# Patient Record
Sex: FEMALE | Race: WHITE | NOT HISPANIC OR LATINO | Employment: FULL TIME | URBAN - NONMETROPOLITAN AREA
[De-identification: names, ages, dates, MRNs, and addresses within clinical notes are randomized per-mention and may not be internally consistent; named-entity substitution may affect disease eponyms.]

---

## 2018-03-01 ENCOUNTER — DOCUMENTATION ONLY (OUTPATIENT)
Dept: FAMILY MEDICINE | Facility: OTHER | Age: 53
End: 2018-03-01

## 2018-03-01 RX ORDER — SUCRALFATE 1 G/1
1 TABLET ORAL
COMMUNITY
Start: 2016-08-30 | End: 2019-07-11

## 2019-07-11 ENCOUNTER — OFFICE VISIT (OUTPATIENT)
Dept: FAMILY MEDICINE | Facility: OTHER | Age: 54
End: 2019-07-11
Attending: FAMILY MEDICINE
Payer: COMMERCIAL

## 2019-07-11 VITALS
HEIGHT: 67 IN | RESPIRATION RATE: 20 BRPM | HEART RATE: 72 BPM | TEMPERATURE: 97.5 F | DIASTOLIC BLOOD PRESSURE: 70 MMHG | WEIGHT: 152 LBS | BODY MASS INDEX: 23.86 KG/M2 | SYSTOLIC BLOOD PRESSURE: 102 MMHG | OXYGEN SATURATION: 95 %

## 2019-07-11 DIAGNOSIS — R21 RASH: ICD-10-CM

## 2019-07-11 DIAGNOSIS — Z82.49 FAMILY HISTORY OF ISCHEMIC HEART DISEASE: Primary | ICD-10-CM

## 2019-07-11 PROCEDURE — 99203 OFFICE O/P NEW LOW 30 MIN: CPT | Performed by: FAMILY MEDICINE

## 2019-07-11 RX ORDER — PREDNISONE 20 MG/1
20 TABLET ORAL 2 TIMES DAILY
Qty: 10 TABLET | Refills: 0 | Status: SHIPPED | OUTPATIENT
Start: 2019-07-11 | End: 2019-08-20

## 2019-07-11 RX ORDER — CHOLECALCIFEROL (VITAMIN D3) 50 MCG
1 TABLET ORAL DAILY
COMMUNITY
Start: 2019-07-11

## 2019-07-11 RX ORDER — METAPROTERENOL SULFATE 10 MG
500 TABLET ORAL DAILY
COMMUNITY
Start: 2019-07-11

## 2019-07-11 ASSESSMENT — ENCOUNTER SYMPTOMS
CHEST TIGHTNESS: 0
FEVER: 0
COUGH: 0
CHILLS: 0

## 2019-07-11 ASSESSMENT — PAIN SCALES - GENERAL: PAINLEVEL: NO PAIN (0)

## 2019-07-11 ASSESSMENT — MIFFLIN-ST. JEOR: SCORE: 1322.1

## 2019-07-11 ASSESSMENT — PATIENT HEALTH QUESTIONNAIRE - PHQ9: SUM OF ALL RESPONSES TO PHQ QUESTIONS 1-9: 9

## 2019-07-11 NOTE — PROGRESS NOTES
"  SUBJECTIVE:   Nursing Notes:   Tasneem Green LPN  2019  1:19 PM  Sign at exiting of workspace  Chief Complaint   Patient presents with     Heart Problem     referral for heart scan      Patient is requesting to be seen by the heart institute in [place]. She believes it is called a heart scan . She has family history of heart problems.   Initial /70 (BP Location: Right arm, Patient Position: Sitting, Cuff Size: Adult Regular)   Pulse 72   Temp 97.5  F (36.4  C) (Tympanic)   Resp 20   Ht 1.702 m (5' 7\")   Wt 68.9 kg (152 lb)   SpO2 95%   BMI 23.81 kg/m    Estimated body mass index is 23.81 kg/m  as calculated from the following:    Height as of this encounter: 1.702 m (5' 7\").    Weight as of this encounter: 68.9 kg (152 lb).  Medication Reconciliation: complete    Tasneem Green LPN    Rhoda Herrera is a 53 year old female who presents to clinic today to discuss a positive family history of heart disease.  Would like a referral to [place] to have a heart scan.  Mom and dad have both had history of coronary artery disease.  Dad  of massive MI at age 77.  This was his first MI, but may have had issues prior to this (never wanted to go to the doctor).  Mom has had stents in 2 arteries several years ago.  She has not had any chest pain, but has had episodes of shortness of breath at times.  Has had episodes where she would feel like she could smell smoke, but no smoke was there.  If she blew her nose, it would go away.  Yet, she doesn't feel like she has chronic issues with congestion.  She does feel that her sense of smell is not as sharp as it used to be.  No shortness of breath with exertion typically.  No diaphoretic.  She has had issues with palpitations in the past since around age 21.  She has had a Holter monitor in the past.  She has been taking benadryl at night for the past week due to poison ivy.      Also has a rash on her legs and forearms which she thinks may be poison ivy.  " She has not been spending any significant time in weeds, etc.  However, she was sitting in her husbands truck with shorts on and thinks she may have sat on his work clothes which may have been in contact with poison ivy.      HPI    I personally reviewed medications/allergies/history listed below:    Patient Active Problem List    Diagnosis Date Noted     Epigastric pain 2016     Priority: Medium     Past Medical History:   Diagnosis Date     Gastro-esophageal reflux disease without esophagitis     No Comments Provided     Hiatal hernia       Past Surgical History:   Procedure Laterality Date      SECTION       (2 , 1 , 1 )     TONSILLECTOMY      No Comments Provided     Family History   Problem Relation Age of Onset     Heart Disease Mother      Heart Disease Father      Chronic Obstructive Pulmonary Disease Father         asthma/allergies     GERD Father         had a Nissen for Hiatal Hernia     Fibrocystic breast disease Sister      Diabetes Type 2  Brother      Heart Failure Brother         smoker (may have drank and used some other substances too)     Dementia Maternal Grandmother      Other - See Comments Maternal Grandfather         Bowel Obstruction - uncertain cause     Heart Disease Paternal Grandmother          in her sleep     Family History Negative Paternal Grandfather          of old age in his 90s     Social History     Tobacco Use     Smoking status: Former Smoker     Packs/day: 0.50     Years: 18.00     Pack years: 9.00     Types: Cigarettes     Smokeless tobacco: Never Used   Substance Use Topics     Alcohol use: Yes     Comment: Alcoholic Drinks/day: rare     Social History     Social History Narrative    .  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     - Patricio -  - teaches at [employer] in [place].     Current Outpatient Medications   Medication Sig Dispense Refill     aspirin (ASA) 81 MG EC tablet Take 1  "tablet (81 mg) by mouth daily       magnesium aspartate (MAGINEX) 615 MG EC tablet Take 1 tablet (615 mg) by mouth daily       Omega-3 Fish Oil 500 MG capsule Take 1 capsule (500 mg) by mouth daily       predniSONE (DELTASONE) 20 MG tablet Take 1 tablet (20 mg) by mouth 2 times daily for 5 days 10 tablet 0     vitamin D3 (CHOLECALCIFEROL) 2000 units (50 mcg) tablet Take 1 tablet (2,000 Units) by mouth daily       No Known Allergies    Review of Systems   Constitutional: Negative for chills and fever.   Respiratory: Negative for cough and chest tightness.    Cardiovascular: Negative for peripheral edema.   Psychiatric/Behavioral: Negative for mood changes.        OBJECTIVE:     /70 (BP Location: Right arm, Patient Position: Sitting, Cuff Size: Adult Regular)   Pulse 72   Temp 97.5  F (36.4  C) (Tympanic)   Resp 20   Ht 1.702 m (5' 7\")   Wt 68.9 kg (152 lb)   SpO2 95%   BMI 23.81 kg/m    Body mass index is 23.81 kg/m .  Physical Exam   Constitutional: She appears well-developed.   HENT:   Head: Normocephalic.   Eyes: Pupils are equal, round, and reactive to light.   Neck: Normal range of motion. Neck supple. No thyromegaly present.   Cardiovascular: Normal rate, regular rhythm, normal heart sounds and intact distal pulses.   No murmur heard.  Pulmonary/Chest: Effort normal and breath sounds normal. She has no wheezes. She has no rales.   Musculoskeletal: She exhibits no edema.   Lymphadenopathy:     She has no cervical adenopathy.   Skin: Skin is warm and dry.   On arms and legs there are multiple excoriated and/or scabbed papules.   Psychiatric: She has a normal mood and affect.         I personally reviewed results withpatient as listed below:   Diagnostic Test Results:  none     ASSESSMENT/PLAN:       ICD-10-CM    1. Family history of ischemic heart disease Z82.49 CT Coronary Calcium Scan   2. Rash R21 predniSONE (DELTASONE) 20 MG tablet       1.  With her family history, she is interested in having a " screening CT coronary calcium scan, which was ordered to be completed in [facility] per her request.  2.  This may be due to poison ivy vs other.  She has been using topical steroid cream already.  Add prednisone 20 mg twice daily x 5 days.  If not improving, consider Dermatology referral.    Sandy Amaro MD  [facility]

## 2019-07-11 NOTE — NURSING NOTE
"Chief Complaint   Patient presents with     Heart Problem     referral for heart scan      Patient is requesting to be seen by the heart institute in [place]. She believes it is called a heart scan . She has family history of heart problems.   Initial /70 (BP Location: Right arm, Patient Position: Sitting, Cuff Size: Adult Regular)   Pulse 72   Temp 97.5  F (36.4  C) (Tympanic)   Resp 20   Ht 1.702 m (5' 7\")   Wt 68.9 kg (152 lb)   SpO2 95%   BMI 23.81 kg/m   Estimated body mass index is 23.81 kg/m  as calculated from the following:    Height as of this encounter: 1.702 m (5' 7\").    Weight as of this encounter: 68.9 kg (152 lb).  Medication Reconciliation: complete    Tasneem Green LPN  "

## 2019-07-18 ENCOUNTER — HOSPITAL ENCOUNTER (OUTPATIENT)
Dept: CT IMAGING | Facility: OTHER | Age: 54
Discharge: HOME | End: 2019-07-18
Attending: FAMILY MEDICINE | Admitting: FAMILY MEDICINE

## 2019-07-18 DIAGNOSIS — Z82.49 FAMILY HISTORY OF ISCHEMIC HEART DISEASE: ICD-10-CM

## 2019-07-18 PROCEDURE — 75571 CT HRT W/O DYE W/CA TEST: CPT

## 2019-08-20 ENCOUNTER — HOSPITAL ENCOUNTER (OUTPATIENT)
Dept: GENERAL RADIOLOGY | Facility: OTHER | Age: 54
Discharge: HOME | End: 2019-08-20
Attending: NURSE PRACTITIONER | Admitting: NURSE PRACTITIONER
Payer: COMMERCIAL

## 2019-08-20 ENCOUNTER — OFFICE VISIT (OUTPATIENT)
Dept: FAMILY MEDICINE | Facility: OTHER | Age: 54
End: 2019-08-20
Attending: NURSE PRACTITIONER
Payer: COMMERCIAL

## 2019-08-20 VITALS
OXYGEN SATURATION: 96 % | HEIGHT: 67 IN | SYSTOLIC BLOOD PRESSURE: 118 MMHG | TEMPERATURE: 98 F | WEIGHT: 152.6 LBS | RESPIRATION RATE: 16 BRPM | DIASTOLIC BLOOD PRESSURE: 76 MMHG | BODY MASS INDEX: 23.95 KG/M2 | HEART RATE: 63 BPM

## 2019-08-20 DIAGNOSIS — S60.221A CONTUSION OF RIGHT HAND, INITIAL ENCOUNTER: ICD-10-CM

## 2019-08-20 DIAGNOSIS — M79.641 PAIN OF RIGHT HAND: Primary | ICD-10-CM

## 2019-08-20 PROCEDURE — 99213 OFFICE O/P EST LOW 20 MIN: CPT | Performed by: NURSE PRACTITIONER

## 2019-08-20 PROCEDURE — 73130 X-RAY EXAM OF HAND: CPT | Mod: RT

## 2019-08-20 ASSESSMENT — PAIN SCALES - GENERAL: PAINLEVEL: MILD PAIN (3)

## 2019-08-20 ASSESSMENT — MIFFLIN-ST. JEOR: SCORE: 1324.82

## 2019-08-20 ASSESSMENT — PATIENT HEALTH QUESTIONNAIRE - PHQ9: SUM OF ALL RESPONSES TO PHQ QUESTIONS 1-9: 6

## 2019-08-20 NOTE — NURSING NOTE
"Chief Complaint   Patient presents with     Hand Pain     Pt states that she smashed her hand between scaffolding on saturday, pain has progressively gotten worse.       Initial /76 (BP Location: Left arm, Patient Position: Sitting, Cuff Size: Adult Regular)   Pulse 63   Temp 98  F (36.7  C) (Tympanic)   Resp 16   Ht 1.702 m (5' 7\")   Wt 69.2 kg (152 lb 9.6 oz)   SpO2 96%   Breastfeeding? No   BMI 23.90 kg/m   Estimated body mass index is 23.9 kg/m  as calculated from the following:    Height as of this encounter: 1.702 m (5' 7\").    Weight as of this encounter: 69.2 kg (152 lb 9.6 oz).  Medication Reconciliation: complete    Genia Kaba LPN on 8/20/2019 at 3:13 PM    "

## 2019-08-20 NOTE — PROGRESS NOTES
"Nursing Notes:   Genia Kaba LPN  2019  3:32 PM  Signed  Chief Complaint   Patient presents with     Hand Pain     Pt states that she smashed her hand between scaffolding on saturday, pain has progressively gotten worse.       Initial /76 (BP Location: Left arm, Patient Position: Sitting, Cuff Size: Adult Regular)   Pulse 63   Temp 98  F (36.7  C) (Tympanic)   Resp 16   Ht 1.702 m (5' 7\")   Wt 69.2 kg (152 lb 9.6 oz)   SpO2 96%   Breastfeeding? No   BMI 23.90 kg/m    Estimated body mass index is 23.9 kg/m  as calculated from the following:    Height as of this encounter: 1.702 m (5' 7\").    Weight as of this encounter: 69.2 kg (152 lb 9.6 oz).  Medication Reconciliation: complete    Genia Kaba LPN on 2019 at 3:13 PM      SUBJECTIVE:   Rhoda Herrera is a 53 year old female who presents to clinic today for the following health issues:    Patient presents to the [facility] clinic for pain in her right hand.  She did get it pinched between scaffolding and a bar.  This injury occurred 3 days ago.  She has been using a brace, ice, rest, elevation.  She did note that the pain was worse last evening which prompted her to come to the rapid clinic today.  She is right-hand dominant.  After the injury she had immediate pain, was not able to proceed with house repairs.  The pain is a steady ache, does radiate up into the forearm at times.  Certain movements make it worse, and is better when she is at rest.      Problem list and histories reviewed & adjusted, as indicated.  Additional history: as documented    Patient Active Problem List   Diagnosis     Epigastric pain     Past Surgical History:   Procedure Laterality Date      SECTION       (2 , 1 , 1 )     TONSILLECTOMY      No Comments Provided       Social History     Tobacco Use     Smoking status: Former Smoker     Packs/day: 0.50     Years: 18.00     Pack years: 9.00     Types: Cigarettes     Smokeless tobacco: " "Never Used   Substance Use Topics     Alcohol use: Yes     Comment: Alcoholic Drinks/day: rare     Family History   Problem Relation Age of Onset     Heart Disease Mother      Heart Disease Father      Chronic Obstructive Pulmonary Disease Father         asthma/allergies     GERD Father         had a Nissen for Hiatal Hernia     Fibrocystic breast disease Sister      Diabetes Type 2  Brother      Heart Failure Brother         smoker (may have drank and used some other substances too)     Dementia Maternal Grandmother      Other - See Comments Maternal Grandfather         Bowel Obstruction - uncertain cause     Heart Disease Paternal Grandmother          in her sleep     Family History Negative Paternal Grandfather          of old age in his 90s         Current Outpatient Medications   Medication Sig Dispense Refill     aspirin (ASA) 81 MG EC tablet Take 1 tablet (81 mg) by mouth daily       BLACK COHOSH PO Take by mouth 2 times daily       magnesium aspartate (MAGINEX) 615 MG EC tablet Take 1 tablet (615 mg) by mouth daily       Omega-3 Fish Oil 500 MG capsule Take 1 capsule (500 mg) by mouth daily       vitamin D3 (CHOLECALCIFEROL) 2000 units (50 mcg) tablet Take 1 tablet (2,000 Units) by mouth daily       No Known Allergies      ROS:  Notable findings in the HPI.       OBJECTIVE:     /76 (BP Location: Left arm, Patient Position: Sitting, Cuff Size: Adult Regular)   Pulse 63   Temp 98  F (36.7  C) (Tympanic)   Resp 16   Ht 1.702 m (5' 7\")   Wt 69.2 kg (152 lb 9.6 oz)   SpO2 96%   Breastfeeding? No   BMI 23.90 kg/m    Body mass index is 23.9 kg/m .  GENERAL: healthy, alert and no distress  EYES: Eyes grossly normal to inspection  RESP: Without increased work of breathing  MS: Right hand: Wrist has adequate flexion, extension, inversion, eversion and she is able to do this against resistance.  She does have mild bruising on the dorsal surface of her hand from the first metacarpal to the fourth " metacarpal.  She is tender throughout these areas.  She is able to flex and extend all of her fingers.  SKIN: no suspicious lesions or rashes    Diagnostic Test Results:  Results for orders placed or performed in visit on 08/20/19 (from the past 24 hour(s))   XR Hand Right G/E 3 Views    Narrative    PROCEDURE:  XR HAND RT G/E 3 VW    HISTORY: pinched hand in scaffolding Saturday, pain over dorsal hand;  Pain of right hand    COMPARISON:  None.    TECHNIQUE:  3 views of the right hand were obtained.    FINDINGS:  No fracture or dislocation is identified. The joint spaces  are preserved.        Impression    IMPRESSION: No acute fracture.      LEDA BORJA MD     Completed hand xray.  I personally reviewed the xray.  There was no evidence of fracture upon initial read of xray.  Final read pending by radiology.      ASSESSMENT/PLAN:     1. Pain of right hand  - XR Hand Right G/E 3 Views    2. Contusion of right hand, initial encounter        PLAN:    MS Injury/Pain  ice, heat, elevate, rest, Tylenol, Ibuprofen and she has a splint from home.  She may continue to use this.  Follow-up in 5 to 7 days if not slowly improving.  Home cares and OTC gone over.    Followup:    If not improving or if condition worsens, follow up with your Primary Care Provider    I explained my diagnostic considerations and recommendations to the patient, who voiced understanding and agreement with the treatment plan. All questions were answered. We discussed potential side effects of any prescribed or recommended therapies, as well as expectations for response to treatments.  She was advised to contact our office if there is no improvement or worsening of conditions or symptoms.  If s/s worsen or persist, patient will either come back or follow up with PCP.    Disclaimer:  This note consists of words and symbols derived from keyboarding, dictation, or using voice recognition software. As a result, there may be errors in the script that have  gone undetected. Please consider this when interpreting information found in this note.      Lolly Cabrales NP, 8/20/2019 3:33 PM

## 2019-08-20 NOTE — PATIENT INSTRUCTIONS
Patient Education     Hand Contusion  You have a contusion. This is also called a bruise. There is swelling and some bleeding under the skin, but no broken bones. This injury generally takes a few days to a few weeks to heal.  During that time, the bruise will typically change in color from reddish, to purple-blue, to greenish-yellow, then to yellow-brown.  Home care    Elevate the hand to reduce pain and swelling. As much as possible, sit or lie down with the hand raised about the level of your heart. This is especially important during the first 48 hours.    Ice the hand to help reduce pain and swelling. Wrap a cold source (ice pack or ice cubes in a plastic bag) in a thin towel. Apply to the bruised area for 20 minutes every 1 to 2 hours the first day. Continue this 3 to 4 times a day until the pain and swelling goes away.    Unless another medicine was prescribed, you can take acetaminophen, ibuprofen, or naproxen to control pain. (If you have chronic liver or kidney disease or ever had a stomach ulcer or gastrointestinal bleeding, talk with your doctor before using these medicines.)  Follow up  Follow up with your healthcare provider or our staff as advised. Call if you are not improving within 1 to 2 weeks.  When to seek medical advice   Call your healthcare provider right away if you have any of the following:    Increased pain or swelling    Arm becomes cold, blue, numb or tingly    Signs of infection: Warmth, drainage, or increased redness or pain around the bruise    Inability to move the injured hand     Frequent bruising for unknown reasons        Tylenol 650-1000 mg every 6-8 hours    Ibuprofen 600 mg every 6 hours as needed for pain    Ice 20 minutes on, 3-6 times a day    After day three of injury can use heat 20 minutes on 3-6 times a day.     Braces, ace bandages are ok to use, usually if not broken they are used for 5-7 days.

## 2020-10-20 ENCOUNTER — OFFICE VISIT (OUTPATIENT)
Dept: FAMILY MEDICINE | Facility: OTHER | Age: 55
End: 2020-10-20
Attending: FAMILY MEDICINE
Payer: COMMERCIAL

## 2020-10-20 VITALS
TEMPERATURE: 97.6 F | SYSTOLIC BLOOD PRESSURE: 136 MMHG | HEIGHT: 67 IN | DIASTOLIC BLOOD PRESSURE: 88 MMHG | RESPIRATION RATE: 16 BRPM | BODY MASS INDEX: 24.33 KG/M2 | WEIGHT: 155 LBS

## 2020-10-20 DIAGNOSIS — Z12.11 SCREEN FOR COLON CANCER: ICD-10-CM

## 2020-10-20 DIAGNOSIS — Z12.31 VISIT FOR SCREENING MAMMOGRAM: ICD-10-CM

## 2020-10-20 DIAGNOSIS — Z00.00 HEALTH CARE MAINTENANCE: Primary | ICD-10-CM

## 2020-10-20 DIAGNOSIS — Z12.4 SCREENING FOR CERVICAL CANCER: ICD-10-CM

## 2020-10-20 PROCEDURE — 88142 CYTOPATH C/V THIN LAYER: CPT | Performed by: FAMILY MEDICINE

## 2020-10-20 PROCEDURE — 99396 PREV VISIT EST AGE 40-64: CPT | Performed by: FAMILY MEDICINE

## 2020-10-20 PROCEDURE — G0123 SCREEN CERV/VAG THIN LAYER: HCPCS | Performed by: FAMILY MEDICINE

## 2020-10-20 PROCEDURE — 87624 HPV HI-RISK TYP POOLED RSLT: CPT | Mod: ZL | Performed by: FAMILY MEDICINE

## 2020-10-20 RX ORDER — ZINC GLUCONATE 50 MG
50 TABLET ORAL DAILY
COMMUNITY
Start: 2020-10-20

## 2020-10-20 ASSESSMENT — ENCOUNTER SYMPTOMS
SHORTNESS OF BREATH: 0
NERVOUS/ANXIOUS: 0
CHILLS: 0
FEVER: 0
COUGH: 0

## 2020-10-20 ASSESSMENT — PAIN SCALES - GENERAL: PAINLEVEL: NO PAIN (0)

## 2020-10-20 ASSESSMENT — MIFFLIN-ST. JEOR: SCORE: 1317.77

## 2020-10-20 NOTE — NURSING NOTE
Patient presents to clinic for physical exam.  Medication Reconciliation: complete    Tianna Schafer, MELIN

## 2020-10-20 NOTE — PROGRESS NOTES
SUBJECTIVE:   Nursing Notes:   Tianna Schafer LPN  10/20/2020  2:06 PM  Sign at exiting of workspace  Patient presents to clinic for physical exam.  Medication Reconciliation: complete    Tianna Schafer LPN        Rhoda Herrera is a 55 year old female who presents to clinic today for a physical.  It has been several years since she had a physical, including a Pap, mammogram.  She has not had any colon cancer screening yet.  She cannot recall when she had her last tetanus and whooping cough booster.    HPI    I personally reviewed medications/allergies/history listed below:    Patient Active Problem List    Diagnosis Date Noted     Epigastric pain 2016     Priority: Medium     Past Medical History:   Diagnosis Date     Gastro-esophageal reflux disease without esophagitis     No Comments Provided     Hiatal hernia       Past Surgical History:   Procedure Laterality Date      SECTION       (2 , 1 , 1 )     TONSILLECTOMY      No Comments Provided     Family History   Problem Relation Age of Onset     Heart Disease Mother      Heart Disease Father      Chronic Obstructive Pulmonary Disease Father         asthma/allergies     GERD Father         had a Nissen for Hiatal Hernia     Fibrocystic breast disease Sister      Diabetes Type 2  Brother      Heart Failure Brother         smoker (may have drank and used some other substances too)     Dementia Maternal Grandmother      Other - See Comments Maternal Grandfather         Bowel Obstruction - uncertain cause     Heart Disease Paternal Grandmother          in her sleep     Family History Negative Paternal Grandfather          of old age in his 90s     Social History     Tobacco Use     Smoking status: Former Smoker     Packs/day: 0.50     Years: 18.00     Pack years: 9.00     Types: Cigarettes     Smokeless tobacco: Never Used   Substance Use Topics     Alcohol use: Yes     Comment: Alcoholic Drinks/day: rare     Social  "History     Social History Narrative    .  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     Current Outpatient Medications   Medication Sig Dispense Refill     BLACK COHOSH PO Take by mouth 2 times daily       magnesium aspartate (MAGINEX) 615 MG EC tablet Take 1 tablet (615 mg) by mouth daily       Omega-3 Fish Oil 500 MG capsule Take 1 capsule (500 mg) by mouth daily       vitamin D3 (CHOLECALCIFEROL) 2000 units (50 mcg) tablet Take 1 tablet (2,000 Units) by mouth daily       zinc gluconate 50 MG tablet Take 1 tablet (50 mg) by mouth daily       No Known Allergies    Review of Systems   Constitutional: Negative for chills and fever.   Respiratory: Negative for cough and shortness of breath.    Cardiovascular: Negative for peripheral edema.   Psychiatric/Behavioral: Negative for mood changes. The patient is not nervous/anxious.         OBJECTIVE:     /88 (BP Location: Right arm, Patient Position: Sitting, Cuff Size: Adult Regular)   Temp 97.6  F (36.4  C) (Tympanic)   Resp 16   Ht 1.689 m (5' 6.5\")   Wt 70.3 kg (155 lb)   LMP  (LMP Unknown)   Breastfeeding No   BMI 24.64 kg/m    Body mass index is 24.64 kg/m .  Physical Exam  Constitutional:       General: She is not in acute distress.     Appearance: Normal appearance. She is well-developed.   HENT:      Head: Normocephalic.      Right Ear: Tympanic membrane and external ear normal.      Left Ear: Tympanic membrane and external ear normal.      Nose: Nose normal.      Mouth/Throat:      Pharynx: No oropharyngeal exudate.   Eyes:      General:         Right eye: No discharge.         Left eye: No discharge.      Conjunctiva/sclera: Conjunctivae normal.      Pupils: Pupils are equal, round, and reactive to light.   Neck:      Musculoskeletal: Neck supple.      Thyroid: No thyromegaly.      Trachea: No tracheal deviation.   Cardiovascular:      Rate and Rhythm: " Normal rate and regular rhythm.      Pulses: Normal pulses.      Heart sounds: Normal heart sounds, S1 normal and S2 normal. No murmur. No friction rub. No gallop. No S3 or S4 sounds.    Pulmonary:      Effort: Pulmonary effort is normal. No respiratory distress.      Breath sounds: Normal breath sounds. No wheezing or rales.      Comments: Breast exam:  No masses palpable bilaterally.  No skin changes, tethering or axillary lymphadenopathy bilaterally.    Abdominal:      General: Bowel sounds are normal. There is no distension.      Palpations: Abdomen is soft. There is no mass.      Tenderness: There is no abdominal tenderness.   Genitourinary:     Comments: Pelvic Exam:  Vulva: No external lesions, normal hair distribution, no adenopathy  Vagina: Moist, pink, no abnormal discharge, well rugated, no lesions  Cervix: Pap smear is taken, parous, smooth, pink, no visible lesions  Uterus: Normal size, anteverted, non-tender, mobile  Ovaries: No mass, non-tender, mobile  Musculoskeletal: Normal range of motion.   Lymphadenopathy:      Cervical: No cervical adenopathy.   Skin:     General: Skin is warm and dry.      Findings: No rash.   Neurological:      Mental Status: She is alert and oriented to person, place, and time.      Motor: No abnormal muscle tone.      Deep Tendon Reflexes: Reflexes are normal and symmetric.   Psychiatric:         Thought Content: Thought content normal.         Judgment: Judgment normal.           PHQ-9 SCORE 7/11/2019 8/20/2019   PHQ-9 Total Score 9 6       PHQ-2 Score:     PHQ-2 ( 1999 Pf[employer]) 10/20/2020 8/20/2019   Q1: Little interest or pleasure in doing things 0 0   Q2: Feeling down, depressed or hopeless 0 2   PHQ-2 Score 0 2         I personally reviewed results withpatient as listed below:   Diagnostic Test Results:  None    ASSESSMENT/PLAN:       ICD-10-CM    1. Health care maintenance  Z00.00    2. Visit for screening mammogram  Z12.31 MA Screen Bilateral w/Braxton   3. Screening for  cervical cancer  Z12.4 HPV High Risk Types DNA Cervical     Pap Screen Thin Prep with HPV - recommended age 30 - 65 years (select HPV order below)   4. Screen for colon cancer  Z12.11 ABSTRACT COLOGUARD-NO CHARGE       1.  Mammogram ordered as noted below.  Pap/HPV completed today.  Cologuard ordered.  She declined a Tdap today.  She will obtain a flu shot through her employer.  She declined any further lab testing as well.  2.  See #1.  3.  See #1.  4.  See #1.    Sandy Amaro MD  [facility] AND [facility]    Portions of this dictation were created using the Dragon Nuance voice recognition system. Proofreading was completed but there may be errors in text.

## 2020-10-21 LAB
COPATH REPORT: NORMAL
PAP: NORMAL

## 2020-10-26 LAB
FINAL DIAGNOSIS: NORMAL
HPV HR 12 DNA CVX QL NAA+PROBE: NEGATIVE
HPV16 DNA SPEC QL NAA+PROBE: NEGATIVE
HPV18 DNA SPEC QL NAA+PROBE: NEGATIVE
SPECIMEN DESCRIPTION: NORMAL
SPECIMEN SOURCE CVX/VAG CYTO: NORMAL

## 2020-11-06 ENCOUNTER — HOSPITAL ENCOUNTER (OUTPATIENT)
Dept: MAMMOGRAPHY | Facility: OTHER | Age: 55
Discharge: HOME | End: 2020-11-06
Attending: FAMILY MEDICINE | Admitting: FAMILY MEDICINE
Payer: COMMERCIAL

## 2020-11-06 DIAGNOSIS — Z12.31 VISIT FOR SCREENING MAMMOGRAM: ICD-10-CM

## 2020-11-06 PROCEDURE — 77067 SCR MAMMO BI INCL CAD: CPT

## 2021-01-03 ENCOUNTER — HEALTH MAINTENANCE LETTER (OUTPATIENT)
Age: 56
End: 2021-01-03

## 2021-05-10 ENCOUNTER — OFFICE VISIT (OUTPATIENT)
Dept: FAMILY MEDICINE | Facility: OTHER | Age: 56
End: 2021-05-10
Attending: NURSE PRACTITIONER
Payer: COMMERCIAL

## 2021-05-10 ENCOUNTER — HOSPITAL ENCOUNTER (OUTPATIENT)
Dept: GENERAL RADIOLOGY | Facility: OTHER | Age: 56
End: 2021-05-10
Attending: NURSE PRACTITIONER
Payer: COMMERCIAL

## 2021-05-10 VITALS
WEIGHT: 159.44 LBS | DIASTOLIC BLOOD PRESSURE: 74 MMHG | BODY MASS INDEX: 25.02 KG/M2 | TEMPERATURE: 98.4 F | HEART RATE: 66 BPM | SYSTOLIC BLOOD PRESSURE: 118 MMHG | HEIGHT: 67 IN | RESPIRATION RATE: 18 BRPM | OXYGEN SATURATION: 99 %

## 2021-05-10 DIAGNOSIS — M79.671 RIGHT FOOT PAIN: Primary | ICD-10-CM

## 2021-05-10 PROCEDURE — 73630 X-RAY EXAM OF FOOT: CPT | Mod: RT

## 2021-05-10 PROCEDURE — 99213 OFFICE O/P EST LOW 20 MIN: CPT | Performed by: NURSE PRACTITIONER

## 2021-05-10 RX ORDER — FLUOROMETHOLONE 0.1 %
SUSPENSION, DROPS(FINAL DOSAGE FORM)(ML) OPHTHALMIC (EYE)
COMMUNITY
Start: 2021-01-12 | End: 2022-12-20

## 2021-05-10 ASSESSMENT — PAIN SCALES - GENERAL: PAINLEVEL: MILD PAIN (2)

## 2021-05-10 ASSESSMENT — MIFFLIN-ST. JEOR: SCORE: 1337.89

## 2021-05-10 NOTE — PATIENT INSTRUCTIONS
Apply an ace wrap to the right foot.     Alternate tylenol and ibuprofen.     Applying a cool compress.

## 2021-05-10 NOTE — NURSING NOTE
Patient presents to clinic after fall 2 weeks ago.  She is experiencing burning achy pain, tingling, swelling and discoloration.    Medication Reconciliation: complete    Tianna Schafer LPN

## 2021-06-11 NOTE — PROGRESS NOTES
"ASSESSMENT/PLAN:  1. Right foot pain    - XR Foot Right G/E 3 Views    Xray films and radiology report were reviewed.     Discussed xray results with the patient and informed her that there are no acute or subacute fractures. Calcific densities may be related to chronic injury of the posterior tibialis tendon, per radiology report.     Instructed the patient to apply ace wrap to her right foot, to help to reduce swelling and apply a cool compress.     Follow up with her PCP if her symptoms persist or worsen and they may consider further imaging at that time.        May use over-the-counter Tylenol or ibuprofen PRN    Discussed warning signs/symptoms indicative of need to f/u    Follow up if symptoms persist or worsen or concerns      I explained my diagnostic considerations and recommendations to the patient, who voiced understanding and agreement with the treatment plan. All questions were answered. We discussed potential side effects of any prescribed or recommended therapies, as well as expectations for response to treatments.    Disclaimer:  This note consists of words and symbols derived from keyboarding, dictation, or using voice recognition software. As a result, there may be errors in the script that have gone undetected. Please consider this when interpreting information found in this note.    HPI:    Rhoda Herrera is a 55 year old female  who presents to [facility] today for right foot pain. Described the pain as an aching. Rating her pain 2/10, pain is worse with ROM. She reports that the pain is up her right great toe and into her foot. Reports pain with extension of the toes. She state that she did fall on stairs 2 weeks ago. She has not taken OTC medication for pain. She tried apply cool compress. She has also had tingling to the right foot.     Past Medical History:   Diagnosis Date     Gastro-esophageal reflux disease without esophagitis     No Comments Provided     Hiatal hernia      Past Surgical  "History:   Procedure Laterality Date      SECTION       (2 , 1 , 1 )     TONSILLECTOMY      No Comments Provided     Social History     Tobacco Use     Smoking status: Former Smoker     Packs/day: 0.50     Years: 18.00     Pack years: 9.00     Types: Cigarettes     Smokeless tobacco: Never Used   Substance Use Topics     Alcohol use: Yes     Comment: Alcoholic Drinks/day: rare     Current Outpatient Medications   Medication Sig Dispense Refill     BLACK COHOSH PO Take by mouth 2 times daily       fluorometholone (FML LIQUIFILM) 0.1 % ophthalmic suspension place ONE DROP IN EACH EYE FOUR TIMES DAILY for TWO weeks then TWICE DAILY IN EACH EYE for TWO weeks       magnesium aspartate (MAGINEX) 615 MG EC tablet Take 1 tablet (615 mg) by mouth daily       Omega-3 Fish Oil 500 MG capsule Take 1 capsule (500 mg) by mouth daily       vitamin D3 (CHOLECALCIFEROL) 2000 units (50 mcg) tablet Take 1 tablet (2,000 Units) by mouth daily       zinc gluconate 50 MG tablet Take 1 tablet (50 mg) by mouth daily       No Known Allergies      Past medical history, past surgical history, current medications and allergies reviewed and accurate to the best of my knowledge.        ROS:  Refer to HPI    /74 (BP Location: Right arm, Patient Position: Sitting, Cuff Size: Adult Regular)   Pulse 66   Temp 98.4  F (36.9  C) (Tympanic)   Resp 18   Ht 1.689 m (5' 6.5\")   Wt 72.3 kg (159 lb 7 oz)   LMP  (LMP Unknown)   SpO2 99%   Breastfeeding No   BMI 25.35 kg/m      EXAM:  General Appearance: Well appearing female, appropriate appearance for age. No acute distress    Musculoskeletal:  Equal movement of bilateral upper extremities.  Equal movement of bilateral lower extremities. Patient reports pain with hyperextension of her toes of the right foot.  Normal gait.    Dermatological: No erythema or ecchymosis to the right foot. Mild swelling noted over the dorsal surface of the right foot. "   Psychological: normal affect, alert, oriented, and pleasant.         Xray:  Results for orders placed or performed in visit on 05/10/21   XR Foot Right G/E 3 Views     Status: None    Narrative    Exam: XR FOOT RIGHT G/E 3 VIEWS     History:Female, age 55 years, Right foot pain    Comparison:  None    Technique: Three views are submitted.    Findings: Bones are normally mineralized. No evidence of acute or  subacute fracture.  No evidence of dislocation.  Joint spaces are well  preserved. Soft tissues demonstrate no evidence of radiodense foreign  body. Os navicularis.           Impression    Impression:  No evidence of acute or subacute bony abnormality.     Calcific densities along the medial aspect of the foot may be related  to chronic injury of the posterior tibialis tendon or perhaps may be  related to navicularis syndrome. MRI would better characterize.    ARCHANA HARVEY MD

## 2021-10-09 ENCOUNTER — HEALTH MAINTENANCE LETTER (OUTPATIENT)
Age: 56
End: 2021-10-09

## 2021-12-04 ENCOUNTER — HEALTH MAINTENANCE LETTER (OUTPATIENT)
Age: 56
End: 2021-12-04

## 2022-09-17 ENCOUNTER — HEALTH MAINTENANCE LETTER (OUTPATIENT)
Age: 57
End: 2022-09-17

## 2022-12-20 ENCOUNTER — OFFICE VISIT (OUTPATIENT)
Dept: FAMILY MEDICINE | Facility: OTHER | Age: 57
End: 2022-12-20
Attending: FAMILY MEDICINE
Payer: COMMERCIAL

## 2022-12-20 ENCOUNTER — HOSPITAL ENCOUNTER (OUTPATIENT)
Dept: MAMMOGRAPHY | Facility: OTHER | Age: 57
Discharge: HOME | End: 2022-12-20
Attending: FAMILY MEDICINE
Payer: COMMERCIAL

## 2022-12-20 VITALS
SYSTOLIC BLOOD PRESSURE: 116 MMHG | DIASTOLIC BLOOD PRESSURE: 72 MMHG | TEMPERATURE: 98.8 F | BODY MASS INDEX: 24.04 KG/M2 | WEIGHT: 149.6 LBS | RESPIRATION RATE: 16 BRPM | HEIGHT: 66 IN

## 2022-12-20 DIAGNOSIS — Z12.11 SCREEN FOR COLON CANCER: ICD-10-CM

## 2022-12-20 DIAGNOSIS — Z00.00 HEALTH CARE MAINTENANCE: Primary | ICD-10-CM

## 2022-12-20 DIAGNOSIS — B00.1 HERPES LABIALIS: ICD-10-CM

## 2022-12-20 DIAGNOSIS — Z82.49 FAMILY HISTORY OF ISCHEMIC HEART DISEASE: ICD-10-CM

## 2022-12-20 DIAGNOSIS — Z12.31 VISIT FOR SCREENING MAMMOGRAM: ICD-10-CM

## 2022-12-20 DIAGNOSIS — Z13.220 SCREENING FOR LIPID DISORDERS: ICD-10-CM

## 2022-12-20 DIAGNOSIS — R19.00 ABDOMINAL WALL BULGE: ICD-10-CM

## 2022-12-20 PROCEDURE — 99396 PREV VISIT EST AGE 40-64: CPT | Performed by: FAMILY MEDICINE

## 2022-12-20 PROCEDURE — 77067 SCR MAMMO BI INCL CAD: CPT

## 2022-12-20 RX ORDER — ASPIRIN 81 MG/1
81 TABLET ORAL DAILY
COMMUNITY

## 2022-12-20 RX ORDER — VALACYCLOVIR HYDROCHLORIDE 1 G/1
2000 TABLET, FILM COATED ORAL 2 TIMES DAILY
Qty: 8 TABLET | Refills: 11 | Status: SHIPPED | OUTPATIENT
Start: 2022-12-20 | End: 2022-12-21

## 2022-12-20 ASSESSMENT — ENCOUNTER SYMPTOMS
CHILLS: 0
HEMATURIA: 0
PALPITATIONS: 0
FEVER: 0
SORE THROAT: 0
HEMATOCHEZIA: 0
FREQUENCY: 0
DIZZINESS: 0
DIARRHEA: 0
WEAKNESS: 0
NERVOUS/ANXIOUS: 0
PARESTHESIAS: 0
ABDOMINAL PAIN: 0
CONSTIPATION: 0
COUGH: 0
JOINT SWELLING: 0
BREAST MASS: 0
SHORTNESS OF BREATH: 0
DYSURIA: 0
HEADACHES: 0
EYE PAIN: 0
HEARTBURN: 0
MYALGIAS: 0
NAUSEA: 0
ARTHRALGIAS: 0

## 2022-12-20 ASSESSMENT — PAIN SCALES - GENERAL: PAINLEVEL: NO PAIN (0)

## 2022-12-20 NOTE — NURSING NOTE
Chief Complaint   Patient presents with     Physical     Has a list of questions and concerns.     Medication Reconciliation: complete    Larissa Keating LPN

## 2022-12-20 NOTE — PROGRESS NOTES
SUBJECTIVE:   CC: Rhoda is an 57 year old who presents for preventive health visit.   Patient has been advised of split billing requirements and indicates understanding: Yes     Healthy Habits:     Getting at least 3 servings of Calcium per day:  Yes    Bi-annual eye exam:  Yes    Dental care twice a year:  Yes    Sleep apnea or symptoms of sleep apnea:  None    Diet:  Regular (no restrictions)    Frequency of exercise:  1 day/week    Duration of exercise:  Less than 15 minutes    Taking medications regularly:  Yes    Medication side effects:  None    PHQ-2 Total Score: 0    Additional concerns today:  Yes     Has felt a bulge in her right upper quadrant.  Felt like she had to push something back in and wonders if she has a hernia.  No surgical incisions in this area.  No prior history of hernia.    Her brother had a stent in his left anterior descending.    Would like a as needed prescription for valtrex due to cold sores.            Today's PHQ-2 Score:   PHQ-2 ( 1999 Pfizer) 12/20/2022   Q1: Little interest or pleasure in doing things 0   Q2: Feeling down, depressed or hopeless 0   PHQ-2 Score 0   PHQ-2 Total Score (12-17 Years)- Positive if 3 or more points; Administer PHQ-A if positive -   Q1: Little interest or pleasure in doing things Not at all   Q2: Feeling down, depressed or hopeless Not at all   PHQ-2 Score 0       Have you ever done Advance Care Planning? (For example, a Health Directive, POLST, or a discussion with a medical provider or your loved ones about your wishes): No, advance care planning information given to patient to review.  Patient declined advance care planning discussion at this time.    Social History     Tobacco Use     Smoking status: Former     Packs/day: 0.50     Years: 18.00     Pack years: 9.00     Types: Cigarettes     Smokeless tobacco: Never   Substance Use Topics     Alcohol use: Yes     Comment: Alcoholic Drinks/day: rare     If you drink alcohol do you typically have >3  drinks per day or >7 drinks per week? No    Alcohol Use 2022   Prescreen: >3 drinks/day or >7 drinks/week? No   Prescreen: >3 drinks/day or >7 drinks/week? -       Reviewed orders with patient.  Reviewed health maintenance and updated orders accordingly - Yes  BP Readings from Last 3 Encounters:   22 116/72   05/10/21 118/74   10/20/20 136/88    Wt Readings from Last 3 Encounters:   22 67.9 kg (149 lb 9.6 oz)   05/10/21 72.3 kg (159 lb 7 oz)   10/20/20 70.3 kg (155 lb)                  Patient Active Problem List   Diagnosis     Epigastric pain     Past Surgical History:   Procedure Laterality Date      SECTION       (2 , 1 , 1 )     TONSILLECTOMY      No Comments Provided       Social History     Tobacco Use     Smoking status: Former     Packs/day: 0.50     Years: 18.00     Pack years: 9.00     Types: Cigarettes     Smokeless tobacco: Never   Substance Use Topics     Alcohol use: Yes     Comment: Alcoholic Drinks/day: rare     Family History   Problem Relation Age of Onset     Heart Disease Mother      Heart Disease Father      Chronic Obstructive Pulmonary Disease Father         asthma/allergies     GERD Father         had a Nissen for Hiatal Hernia     Fibrocystic breast disease Sister      Diabetes Type 2  Brother      Heart Failure Brother         smoker (may have drank and used some other substances too)     Coronary Artery Disease Brother         stent     Dementia Maternal Grandmother      Other - See Comments Maternal Grandfather         Bowel Obstruction - uncertain cause     Heart Disease Paternal Grandmother          in her sleep     Family History Negative Paternal Grandfather          of old age in his 90s         Current Outpatient Medications   Medication Sig Dispense Refill     aspirin 81 MG EC tablet Take 81 mg by mouth daily       BLACK COHOSH PO Take by mouth 2 times daily       magnesium aspartate (MAGINEX) 615 MG EC tablet Take 1 tablet  (615 mg) by mouth daily       Omega-3 Fish Oil 500 MG capsule Take 1 capsule (500 mg) by mouth daily       valACYclovir (VALTREX) 1000 mg tablet Take 2 tablets (2,000 mg) by mouth 2 times daily for 1 day 8 tablet 11     varenicline (TYRVAYA) 0.03 MG/ACT nasal spray Spray 1 spray into both nostrils 2 times daily       vitamin D3 (CHOLECALCIFEROL) 2000 units (50 mcg) tablet Take 1 tablet (2,000 Units) by mouth daily       zinc gluconate 50 MG tablet Take 1 tablet (50 mg) by mouth daily       No Known Allergies    Breast Cancer Screening:  Any new diagnosis of family breast, ovarian, or bowel cancer? No    FHS-7:   Breast CA Risk Assessment (FHS-7) 12/20/2022   Did any of your first-degree relatives have breast or ovarian cancer? No   Did any of your relatives have bilateral breast cancer? No   Did any man in your family have breast cancer? No   Did any woman in your family have breast and ovarian cancer? No   Did any woman in your family have breast cancer before age 50 y? No   Do you have 2 or more relatives with breast and/or ovarian cancer? No   Do you have 2 or more relatives with breast and/or bowel cancer? No       Mammogram Screening: Recommended mammography every 1-2 years with patient discussion and risk factor consideration  Pertinent mammograms are reviewed under the imaging tab.    History of abnormal Pap smear: NO - age 30-65 PAP every 5 years with negative HPV co-testing recommended  PAP / HPV Latest Ref Rng & Units 10/20/2020   PAP (Historical) - NIL   HPV16 NEG:Negative Negative   HPV18 NEG:Negative Negative   HRHPV NEG:Negative Negative     Reviewed and updated as needed this visit by clinical staff   Tobacco  Allergies  Meds              Reviewed and updated as needed this visit by Provider                 Past Medical History:   Diagnosis Date     Gastro-esophageal reflux disease without esophagitis     No Comments Provided     Hiatal hernia       Past Surgical History:   Procedure Laterality  "Date      SECTION       (2 , 1 , 1 )     TONSILLECTOMY      No Comments Provided       Review of Systems  CONSTITUTIONAL: NEGATIVE for fever, chills, change in weight  INTEGUMENTARY/SKIN: NEGATIVE for worrisome rashes, moles or lesions  EYES: NEGATIVE for vision changes or irritation  ENT: NEGATIVE for ear, mouth and throat problems  RESP: NEGATIVE for significant cough or SOB  BREAST: NEGATIVE for masses, tenderness or discharge  CV: NEGATIVE for chest pain, palpitations or peripheral edema  GI: NEGATIVE for nausea, abdominal pain, heartburn, or change in bowel habits  : NEGATIVE for unusual urinary or vaginal symptoms. No vaginal bleeding.  MUSCULOSKELETAL: NEGATIVE for significant arthralgias or myalgia  NEURO: NEGATIVE for weakness, dizziness or paresthesias  PSYCHIATRIC: NEGATIVE for changes in mood or affect      OBJECTIVE:   /72   Temp 98.8  F (37.1  C) (Tympanic)   Resp 16   Ht 1.676 m (5' 6\")   Wt 67.9 kg (149 lb 9.6 oz)   LMP  (LMP Unknown)   Breastfeeding No   BMI 24.15 kg/m    Physical Exam  Constitutional:       General: She is not in acute distress.     Appearance: She is well-developed.   HENT:      Head: Normocephalic.      Right Ear: Tympanic membrane and external ear normal.      Left Ear: Tympanic membrane and external ear normal.      Nose: Nose normal.      Mouth/Throat:      Pharynx: No oropharyngeal exudate.   Eyes:      General:         Right eye: No discharge.         Left eye: No discharge.      Conjunctiva/sclera: Conjunctivae normal.      Pupils: Pupils are equal, round, and reactive to light.   Neck:      Thyroid: No thyromegaly.      Trachea: No tracheal deviation.   Cardiovascular:      Rate and Rhythm: Normal rate and regular rhythm.      Pulses: Normal pulses.      Heart sounds: Normal heart sounds, S1 normal and S2 normal. No murmur heard.    No friction rub. No gallop. No S3 or S4 sounds.   Pulmonary:      Effort: Pulmonary effort is " normal. No respiratory distress.      Breath sounds: Normal breath sounds. No wheezing or rales.      Comments: Breast exam:  No masses palpable bilaterally.  No skin changes, tethering or axillary lymphadenopathy bilaterally.    Abdominal:      General: Bowel sounds are normal. There is no distension.      Palpations: Abdomen is soft. There is no mass.      Tenderness: There is no abdominal tenderness.      Comments: No definite hernia defect is felt.   Genitourinary:     Comments: Pelvic/Rectal exams deferred per patient.  Musculoskeletal:         General: Normal range of motion.      Cervical back: Neck supple.   Lymphadenopathy:      Cervical: No cervical adenopathy.   Skin:     General: Skin is warm and dry.      Findings: No rash.   Neurological:      Mental Status: She is alert and oriented to person, place, and time.      Motor: No abnormal muscle tone.      Deep Tendon Reflexes: Reflexes are normal and symmetric.   Psychiatric:         Thought Content: Thought content normal.         Judgment: Judgment normal.         Diagnostic Test Results:  Labs reviewed in Epic    ASSESSMENT/PLAN:       ICD-10-CM    1. Health care maintenance  Z00.00       2. Screen for colon cancer  Z12.11 COLOGUARD(EXACT SCI[employer])      3. Screening for lipid disorders  Z13.220 Lipid Profile      4. Abdominal wall bulge  R19.00       5. Family history of ischemic heart disease  Z82.49       6. Herpes labialis  B00.1 valACYclovir (VALTREX) 1000 mg tablet        1.  Mammogram completed today.  Pap/HPV are up-to-date, last completed 10/20/2020 and were normal at that time.  Cologuard ordered again today.  She declines flu, COVID and Tdap vaccines as well as Shingrix.  Also declined screening for HIV and hepatitis C as well as hepatitis B vaccine series.  2.  See #1.  3.  Lipid profile ordered.  4.  No definite abdominal bulge was felt, but by history this certainly sounds suspicious for a hernia.  Discussed that we could consider imaging  with CT looking for hernia defect versus having her be evaluated by general surgery.  She will consider this and let us know if she wants anything further ordered.  Discussed signs of incarcerated hernia which she is aware of.  Discussed she needs to follow-up if cannot reduce hernia on her own within a few minutes.  5.  She had an normal CT coronary calcium score of 02 years ago.  Discussed possibly repeating this test at the 5-year margaret.  6.  Prescription for Valtrex provided.    Patient has been advised of split billing requirements and indicates understanding: Yes      COUNSELING:  Reviewed preventive health counseling, as reflected in patient instructions       Regular exercise       Healthy diet/nutrition       Vision screening       Colorectal Cancer Screening       Consider Hep C screening for all patients one time for ages 18-79 years       HIV screeninx in teen years, 1x in adult years, and at intervals if high risk       Consider lung cancer screening for ages 55-80 years (77 for Medicare) and 20 pack-year smoking history         She reports that she has quit smoking. Her smoking use included cigarettes. She has a 9.00 pack-year smoking history. She has never used smokeless tobacco.      Sandy Amaro MD  [facility] AND [facility]

## 2023-02-08 LAB — NONINV COLON CA DNA+OCC BLD SCRN STL QL: NEGATIVE

## 2024-02-25 ENCOUNTER — HEALTH MAINTENANCE LETTER (OUTPATIENT)
Age: 59
End: 2024-02-25

## 2024-10-13 SDOH — HEALTH STABILITY: PHYSICAL HEALTH: ON AVERAGE, HOW MANY MINUTES DO YOU ENGAGE IN EXERCISE AT THIS LEVEL?: 50 MIN

## 2024-10-13 SDOH — HEALTH STABILITY: PHYSICAL HEALTH: ON AVERAGE, HOW MANY DAYS PER WEEK DO YOU ENGAGE IN MODERATE TO STRENUOUS EXERCISE (LIKE A BRISK WALK)?: 3 DAYS

## 2024-10-13 ASSESSMENT — SOCIAL DETERMINANTS OF HEALTH (SDOH): HOW OFTEN DO YOU GET TOGETHER WITH FRIENDS OR RELATIVES?: MORE THAN THREE TIMES A WEEK

## 2024-10-14 ENCOUNTER — OFFICE VISIT (OUTPATIENT)
Dept: FAMILY MEDICINE | Facility: OTHER | Age: 59
End: 2024-10-14
Attending: FAMILY MEDICINE
Payer: COMMERCIAL

## 2024-10-14 VITALS
TEMPERATURE: 97.5 F | WEIGHT: 149 LBS | HEART RATE: 66 BPM | SYSTOLIC BLOOD PRESSURE: 116 MMHG | DIASTOLIC BLOOD PRESSURE: 70 MMHG | RESPIRATION RATE: 16 BRPM | HEIGHT: 67 IN | OXYGEN SATURATION: 96 % | BODY MASS INDEX: 23.39 KG/M2

## 2024-10-14 DIAGNOSIS — B00.1 HERPES LABIALIS: ICD-10-CM

## 2024-10-14 DIAGNOSIS — Z13.0 SCREENING FOR DEFICIENCY ANEMIA: ICD-10-CM

## 2024-10-14 DIAGNOSIS — Z13.29 SCREENING FOR THYROID DISORDER: ICD-10-CM

## 2024-10-14 DIAGNOSIS — Z00.00 ROUTINE GENERAL MEDICAL EXAMINATION AT A HEALTH CARE FACILITY: Primary | ICD-10-CM

## 2024-10-14 DIAGNOSIS — Z13.220 SCREENING FOR LIPID DISORDERS: ICD-10-CM

## 2024-10-14 DIAGNOSIS — Z12.31 VISIT FOR SCREENING MAMMOGRAM: ICD-10-CM

## 2024-10-14 DIAGNOSIS — Z13.6 SCREENING FOR HEART DISEASE: ICD-10-CM

## 2024-10-14 DIAGNOSIS — Z13.1 SCREENING FOR DIABETES MELLITUS: ICD-10-CM

## 2024-10-14 LAB
ALBUMIN SERPL BCG-MCNC: 4.4 G/DL (ref 3.5–5.2)
ALP SERPL-CCNC: 73 U/L (ref 40–150)
ALT SERPL W P-5'-P-CCNC: 22 U/L (ref 0–50)
ANION GAP SERPL CALCULATED.3IONS-SCNC: 6 MMOL/L (ref 7–15)
AST SERPL W P-5'-P-CCNC: 26 U/L (ref 0–45)
BASOPHILS # BLD AUTO: 0.1 10E3/UL (ref 0–0.2)
BASOPHILS NFR BLD AUTO: 1 %
BILIRUB SERPL-MCNC: 0.4 MG/DL
BUN SERPL-MCNC: 15.7 MG/DL (ref 8–23)
CALCIUM SERPL-MCNC: 9.8 MG/DL (ref 8.8–10.4)
CHLORIDE SERPL-SCNC: 105 MMOL/L (ref 98–107)
CHOLEST SERPL-MCNC: 227 MG/DL
CREAT SERPL-MCNC: 0.89 MG/DL (ref 0.51–0.95)
EGFRCR SERPLBLD CKD-EPI 2021: 74 ML/MIN/1.73M2
EOSINOPHIL # BLD AUTO: 0.3 10E3/UL (ref 0–0.7)
EOSINOPHIL NFR BLD AUTO: 4 %
ERYTHROCYTE [DISTWIDTH] IN BLOOD BY AUTOMATED COUNT: 13.7 % (ref 10–15)
FASTING STATUS PATIENT QL REPORTED: NO
FASTING STATUS PATIENT QL REPORTED: NO
GLUCOSE SERPL-MCNC: 97 MG/DL (ref 70–99)
HCO3 SERPL-SCNC: 29 MMOL/L (ref 22–29)
HCT VFR BLD AUTO: 43.9 % (ref 35–47)
HDLC SERPL-MCNC: 80 MG/DL
HGB BLD-MCNC: 14.2 G/DL (ref 11.7–15.7)
IMM GRANULOCYTES # BLD: 0 10E3/UL
IMM GRANULOCYTES NFR BLD: 0 %
LDLC SERPL CALC-MCNC: 132 MG/DL
LYMPHOCYTES # BLD AUTO: 2.1 10E3/UL (ref 0.8–5.3)
LYMPHOCYTES NFR BLD AUTO: 33 %
MCH RBC QN AUTO: 30.7 PG (ref 26.5–33)
MCHC RBC AUTO-ENTMCNC: 32.3 G/DL (ref 31.5–36.5)
MCV RBC AUTO: 95 FL (ref 78–100)
MONOCYTES # BLD AUTO: 0.6 10E3/UL (ref 0–1.3)
MONOCYTES NFR BLD AUTO: 10 %
NEUTROPHILS # BLD AUTO: 3.4 10E3/UL (ref 1.6–8.3)
NEUTROPHILS NFR BLD AUTO: 52 %
NONHDLC SERPL-MCNC: 147 MG/DL
NRBC # BLD AUTO: 0 10E3/UL
NRBC BLD AUTO-RTO: 0 /100
PLATELET # BLD AUTO: 316 10E3/UL (ref 150–450)
POTASSIUM SERPL-SCNC: 5.3 MMOL/L (ref 3.4–5.3)
PROT SERPL-MCNC: 7.8 G/DL (ref 6.4–8.3)
RBC # BLD AUTO: 4.63 10E6/UL (ref 3.8–5.2)
SODIUM SERPL-SCNC: 140 MMOL/L (ref 135–145)
TRIGL SERPL-MCNC: 75 MG/DL
TSH SERPL DL<=0.005 MIU/L-ACNC: 1.85 UIU/ML (ref 0.3–4.2)
WBC # BLD AUTO: 6.6 10E3/UL (ref 4–11)

## 2024-10-14 PROCEDURE — 99396 PREV VISIT EST AGE 40-64: CPT | Performed by: FAMILY MEDICINE

## 2024-10-14 PROCEDURE — 82465 ASSAY BLD/SERUM CHOLESTEROL: CPT | Mod: ZL | Performed by: FAMILY MEDICINE

## 2024-10-14 PROCEDURE — 85004 AUTOMATED DIFF WBC COUNT: CPT | Mod: ZL | Performed by: FAMILY MEDICINE

## 2024-10-14 PROCEDURE — 80053 COMPREHEN METABOLIC PANEL: CPT | Mod: ZL | Performed by: FAMILY MEDICINE

## 2024-10-14 PROCEDURE — 85018 HEMOGLOBIN: CPT | Mod: ZL | Performed by: FAMILY MEDICINE

## 2024-10-14 PROCEDURE — 36415 COLL VENOUS BLD VENIPUNCTURE: CPT | Mod: ZL | Performed by: FAMILY MEDICINE

## 2024-10-14 PROCEDURE — 84443 ASSAY THYROID STIM HORMONE: CPT | Mod: ZL | Performed by: FAMILY MEDICINE

## 2024-10-14 RX ORDER — AMPICILLIN TRIHYDRATE 250 MG
CAPSULE ORAL
COMMUNITY
Start: 2023-10-30

## 2024-10-14 RX ORDER — DM/PE/ACETAMINOPHEN/CHLORPHENR 10-5-325-2
TABLET, SEQUENTIAL ORAL
COMMUNITY
Start: 2023-10-30

## 2024-10-14 RX ORDER — VALACYCLOVIR HYDROCHLORIDE 1 G/1
2000 TABLET, FILM COATED ORAL 2 TIMES DAILY
Qty: 8 TABLET | Refills: 11 | Status: SHIPPED | OUTPATIENT
Start: 2024-10-14

## 2024-10-14 ASSESSMENT — PAIN SCALES - GENERAL: PAINLEVEL: NO PAIN (0)

## 2024-10-14 NOTE — PATIENT INSTRUCTIONS
Patient Education   Preventive Care Advice   This is general advice given by our system to help you stay healthy. However, your care team may have specific advice just for you. Please talk to your care team about your preventive care needs.  Nutrition  Eat 5 or more servings of fruits and vegetables each day.  Try wheat bread, brown rice and whole grain pasta (instead of white bread, rice, and pasta).  Get enough calcium and vitamin D. Check the label on foods and aim for 100% of the RDA (recommended daily allowance).  Lifestyle  Exercise at least 150 minutes each week  (30 minutes a day, 5 days a week).  Do muscle strengthening activities 2 days a week. These help control your weight and prevent disease.  No smoking.  Wear sunscreen to prevent skin cancer.  Have a dental exam and cleaning every 6 months.  Yearly exams  See your health care team every year to talk about:  Any changes in your health.  Any medicines your care team has prescribed.  Preventive care, family planning, and ways to prevent chronic diseases.  Shots (vaccines)   HPV shots (up to age 26), if you've never had them before.  Hepatitis B shots (up to age 59), if you've never had them before.  COVID-19 shot: Get this shot when it's due.  Flu shot: Get a flu shot every year.  Tetanus shot: Get a tetanus shot every 10 years.  Pneumococcal, hepatitis A, and RSV shots: Ask your care team if you need these based on your risk.  Shingles shot (for age 50 and up)  General health tests  Diabetes screening:  Starting at age 35, Get screened for diabetes at least every 3 years.  If you are younger than age 35, ask your care team if you should be screened for diabetes.  Cholesterol test: At age 39, start having a cholesterol test every 5 years, or more often if advised.  Bone density scan (DEXA): At age 50, ask your care team if you should have this scan for osteoporosis (brittle bones).  Hepatitis C: Get tested at least once in your life.  STIs (sexually  transmitted infections)  Before age 24: Ask your care team if you should be screened for STIs.  After age 24: Get screened for STIs if you're at risk. You are at risk for STIs (including HIV) if:  You are sexually active with more than one person.  You don't use condoms every time.  You or a partner was diagnosed with a sexually transmitted infection.  If you are at risk for HIV, ask about PrEP medicine to prevent HIV.  Get tested for HIV at least once in your life, whether you are at risk for HIV or not.  Cancer screening tests  Cervical cancer screening: If you have a cervix, begin getting regular cervical cancer screening tests starting at age 21.  Breast cancer scan (mammogram): If you've ever had breasts, begin having regular mammograms starting at age 40. This is a scan to check for breast cancer.  Colon cancer screening: It is important to start screening for colon cancer at age 45.  Have a colonoscopy test every 10 years (or more often if you're at risk) Or, ask your provider about stool tests like a FIT test every year or Cologuard test every 3 years.  To learn more about your testing options, visit:   .  For help making a decision, visit:   https://bit.ly/py48755.  Prostate cancer screening test: If you have a prostate, ask your care team if a prostate cancer screening test (PSA) at age 55 is right for you.  Lung cancer screening: If you are a current or former smoker ages 50 to 80, ask your care team if ongoing lung cancer screenings are right for you.  For informational purposes only. Not to replace the advice of your health care provider. Copyright   2023 [employer]. All rights reserved. Clinically reviewed by the [facility] Transitions Program. FUJIAN HAIYUAN [id number] - REV 01/24.

## 2024-10-14 NOTE — NURSING NOTE
Chief Complaint   Patient presents with    Physical         Medication Reconciliation: complete    Larissa Keating, LPN

## 2024-10-14 NOTE — PROGRESS NOTES
Preventive Care Visit  [facility] AND [facility]  Sandy Amaro MD, Family Medicine  Oct 14, 2024          Nehemias Duong is a 59 year old, presenting for the following:  Physical        10/14/2024     8:08 AM   Additional Questions   Roomed by Larissa Keating      Rhoda is here today for an annual wellness visit.  She has had a CT coronary calcium score about 5 years ago.  Her father had coronary artery disease and she wants to recheck this periodically.  She would like this rechecked at this time.    Health Care Directive  Patient does not have a Health Care Directive or Living Will: Discussed advance care planning with patient; however, patient declined at this time.          10/13/2024   General Health   How would you rate your overall physical health? Good   Feel stress (tense, anxious, or unable to sleep) Not at all            10/13/2024   Nutrition   Three or more servings of calcium each day? Yes   Diet: Regular (no restrictions)   How many servings of fruit and vegetables per day? (!) 2-3   How many sweetened beverages each day? 0-1            10/13/2024   Exercise   Days per week of moderate/strenous exercise 3 days   Average minutes spent exercising at this level 50 min            10/13/2024   Social Factors   Frequency of gathering with friends or relatives More than three times a week   Worry food won't last until get money to buy more No   Food not last or not have enough money for food? No   Do you have housing? (Housing is defined as stable permanent housing and does not include staying ouside in a car, in a tent, in an abandoned building, in an overnight shelter, or couch-surfing.) Yes   Are you worried about losing your housing? No   Lack of transportation? No   Unable to get utilities (heat,electricity)? No            10/13/2024   Fall Risk   Fallen 2 or more times in the past year? No   Trouble with walking or balance? No             10/13/2024   Dental   Dentist two times every  year? Yes            10/13/2024   TB Screening   Were you born outside of the [place]? No            Today's PHQ-2 Score:       10/13/2024     9:36 AM   PHQ-2 ( 1999 [employer])   Q1: Little interest or pleasure in doing things 0   Q2: Feeling down, depressed or hopeless 0   PHQ-2 Score 0   Q1: Little interest or pleasure in doing things Not at all   Q2: Feeling down, depressed or hopeless Not at all   PHQ-2 Score 0           10/13/2024   Substance Use   Alcohol more than 3/day or more than 7/wk No   Do you use any other substances recreationally? No        Social History     Tobacco Use    Smoking status: Former     Current packs/day: 0.50     Average packs/day: 0.5 packs/day for 18.0 years (9.0 ttl pk-yrs)     Types: Cigarettes    Smokeless tobacco: Never   Vaping Use    Vaping status: Never Used   Substance Use Topics    Alcohol use: Yes     Comment: Alcoholic Drinks/day: rare    Drug use: Never           12/20/2022   LAST FHS-7 RESULTS   1st degree relative breast or ovarian cancer No   Any relative bilateral breast cancer No   Any male have breast cancer No   Any ONE woman have BOTH breast AND ovarian cancer No   Any woman with breast cancer before 50yrs No   2 or more relatives with breast AND/OR ovarian cancer No   2 or more relatives with breast AND/OR bowel cancer No           Mammogram Screening - Mammogram every 1-2 years updated in Health Maintenance based on mutual decision making        10/13/2024   STI Screening   New sexual partner(s) since last STI/HIV test? No        History of abnormal Pap smear: No - age 30- 64 PAP with HPV every 5 years recommended        Latest Ref Rng & Units 10/20/2020     2:23 PM   PAP / HPV   PAP (Historical)  NIL    HPV 16 DNA NEG^Negative Negative    HPV 18 DNA NEG^Negative Negative    Other HR HPV NEG^Negative Negative      ASCVD Risk   The ASCVD Risk score (Lucia VALENZUELA, et al., 2019) failed to calculate for the following reasons:    Cannot find a previous HDL lab     Cannot find a previous total cholesterol lab           Reviewed and updated as needed this visit by Provider                    Past Medical History:   Diagnosis Date    Gastro-esophageal reflux disease without esophagitis     No Comments Provided    Hiatal hernia      Past Surgical History:   Procedure Laterality Date     SECTION       (2 , 1 , 1 )    TONSILLECTOMY      No Comments Provided     BP Readings from Last 3 Encounters:   10/14/24 116/70   22 116/72   05/10/21 118/74    Wt Readings from Last 3 Encounters:   10/14/24 67.6 kg (149 lb)   22 67.9 kg (149 lb 9.6 oz)   05/10/21 72.3 kg (159 lb 7 oz)                  Patient Active Problem List   Diagnosis    Epigastric pain    Herpes labialis    Family history of ischemic heart disease     Past Surgical History:   Procedure Laterality Date     SECTION       (2 , 1 , 1 )    TONSILLECTOMY      No Comments Provided       Social History     Tobacco Use    Smoking status: Former     Current packs/day: 0.50     Average packs/day: 0.5 packs/day for 18.0 years (9.0 ttl pk-yrs)     Types: Cigarettes    Smokeless tobacco: Never   Substance Use Topics    Alcohol use: Yes     Comment: Alcoholic Drinks/day: rare     Family History   Problem Relation Age of Onset    Heart Disease Mother     Heart Disease Father     Chronic Obstructive Pulmonary Disease Father         asthma/allergies    GERD Father         had a Nissen for Hiatal Hernia    Fibrocystic breast disease Sister     Diabetes Type 2  Brother     Heart Failure Brother         smoker (may have drank and used some other substances too)    Coronary Artery Disease Brother         stent    Dementia Maternal Grandmother     Other - See Comments Maternal Grandfather         Bowel Obstruction - uncertain cause    Heart Disease Paternal Grandmother          in her sleep    Family History Negative Paternal Grandfather          of old age in  "his 90s         Current Outpatient Medications   Medication Sig Dispense Refill    aspirin 81 MG EC tablet Take 81 mg by mouth daily      magnesium aspartate (MAGINEX) 615 MG EC tablet Take 1 tablet (615 mg) by mouth daily      Niacin-Inositol (GNP NIACIN FLUSH FREE) 400-100 MG CAPS       Omega-3 Fish Oil 500 MG capsule Take 1 capsule (500 mg) by mouth daily      red yeast rice 600 MG CAPS       valACYclovir (VALTREX) 1000 mg tablet Take 2 tablets (2,000 mg) by mouth 2 times daily. 8 tablet 11    vitamin D3 (CHOLECALCIFEROL) 2000 units (50 mcg) tablet Take 1 tablet (2,000 Units) by mouth daily      zinc gluconate 50 MG tablet Take 1 tablet (50 mg) by mouth daily       No Known Allergies      Review of Systems  Constitutional, HEENT, cardiovascular, pulmonary, GI, , musculoskeletal, neuro, skin, endocrine and psych systems are negative, except as otherwise noted.     Objective    Exam  /70   Pulse 66   Temp 97.5  F (36.4  C) (Tympanic)   Resp 16   Ht 1.702 m (5' 7\")   Wt 67.6 kg (149 lb)   LMP  (LMP Unknown)   SpO2 96%   Breastfeeding No   BMI 23.34 kg/m     Estimated body mass index is 23.34 kg/m  as calculated from the following:    Height as of this encounter: 1.702 m (5' 7\").    Weight as of this encounter: 67.6 kg (149 lb).    Physical Exam  Constitutional:       General: She is not in acute distress.     Appearance: She is well-developed.   HENT:      Head: Normocephalic.      Right Ear: Tympanic membrane and external ear normal.      Left Ear: Tympanic membrane and external ear normal.      Nose: Nose normal.      Mouth/Throat:      Mouth: Mucous membranes are moist.      Pharynx: Oropharynx is clear. No oropharyngeal exudate or posterior oropharyngeal erythema.   Eyes:      General:         Right eye: No discharge.         Left eye: No discharge.      Conjunctiva/sclera: Conjunctivae normal.      Pupils: Pupils are equal, round, and reactive to light.   Neck:      Thyroid: No thyromegaly.     "  Trachea: No tracheal deviation.   Cardiovascular:      Rate and Rhythm: Normal rate and regular rhythm.      Pulses: Normal pulses.      Heart sounds: Normal heart sounds, S1 normal and S2 normal. No murmur heard.     No friction rub. No gallop. No S3 or S4 sounds.   Pulmonary:      Effort: Pulmonary effort is normal. No respiratory distress.      Breath sounds: Normal breath sounds. No wheezing or rales.      Comments: Breast exam:  No masses palpable bilaterally.  No skin changes, tethering or axillary lymphadenopathy bilaterally.    Abdominal:      General: Bowel sounds are normal. There is no distension.      Palpations: Abdomen is soft. There is no mass.      Tenderness: There is no abdominal tenderness.   Genitourinary:     Comments: Pelvic/Rectal exams deferred per patient.  Musculoskeletal:         General: Normal range of motion.      Cervical back: Neck supple.   Lymphadenopathy:      Cervical: No cervical adenopathy.   Skin:     General: Skin is warm and dry.      Findings: No rash.   Neurological:      Mental Status: She is alert and oriented to person, place, and time.      Motor: No abnormal muscle tone.      Deep Tendon Reflexes: Reflexes are normal and symmetric.   Psychiatric:         Mood and Affect: Mood normal.         Thought Content: Thought content normal.         Judgment: Judgment normal.         ICD-10-CM    1. Routine general medical examination at a health care facility  Z00.00       2. Visit for screening mammogram  Z12.31 MA Screening Bilateral w/ Braxton      3. Screening for heart disease  Z13.6 CT Coronary Calcium Scan      4. Screening for lipid disorders  Z13.220 Lipid Profile      5. Screening for diabetes mellitus  Z13.1 Comprehensive metabolic panel      6. Screening for thyroid disorder  Z13.29 TSH with free T4 reflex      7. Screening for deficiency anemia  Z13.0 CBC with Platelets & Differential      8. Herpes labialis  B00.1 valACYclovir (VALTREX) 1000 mg tablet            Mammogram ordered.  Pap Smear/HPV are up to date last completed 10/20/2020 and were both normal at that time.  Cologuard is up to date, last completed 2/8/23 and was negative.  Declined all vaccines at this time.  See #1.  CT coronary calcium scan ordered as noted above.  Lipid profile as above.  She is fasting today.  Comprehensive Metabolic Profile  as above.  TSH as above.  Complete Blood Count as above.  Valtrex refilled.    Return in about 53 weeks (around 10/20/2025) for Annual Wellness Visit.           Sandy Amaro MD

## 2024-11-15 ENCOUNTER — HOSPITAL ENCOUNTER (OUTPATIENT)
Dept: CT IMAGING | Facility: OTHER | Age: 59
Discharge: HOME | End: 2024-11-15
Attending: FAMILY MEDICINE
Payer: COMMERCIAL

## 2024-11-15 ENCOUNTER — HOSPITAL ENCOUNTER (OUTPATIENT)
Dept: MAMMOGRAPHY | Facility: OTHER | Age: 59
Discharge: HOME | End: 2024-11-15
Attending: FAMILY MEDICINE
Payer: COMMERCIAL

## 2024-11-15 DIAGNOSIS — Z12.31 VISIT FOR SCREENING MAMMOGRAM: ICD-10-CM

## 2024-11-15 DIAGNOSIS — Z13.6 SCREENING FOR HEART DISEASE: ICD-10-CM

## 2024-11-15 PROCEDURE — 77063 BREAST TOMOSYNTHESIS BI: CPT

## 2024-11-15 PROCEDURE — 75571 CT HRT W/O DYE W/CA TEST: CPT
